# Patient Record
Sex: FEMALE | URBAN - METROPOLITAN AREA
[De-identification: names, ages, dates, MRNs, and addresses within clinical notes are randomized per-mention and may not be internally consistent; named-entity substitution may affect disease eponyms.]

---

## 2019-05-30 ENCOUNTER — OFFICE VISIT (OUTPATIENT)
Dept: URBAN - METROPOLITAN AREA CLINIC 81 | Facility: CLINIC | Age: 74
End: 2019-05-30
Payer: MEDICARE

## 2019-05-30 DIAGNOSIS — H25.13 AGE-RELATED NUCLEAR CATARACT, BILATERAL: ICD-10-CM

## 2019-05-30 DIAGNOSIS — H52.4 PRESBYOPIA: Primary | ICD-10-CM

## 2019-05-30 PROCEDURE — 92004 COMPRE OPH EXAM NEW PT 1/>: CPT | Performed by: OPTOMETRIST

## 2019-05-30 ASSESSMENT — VISUAL ACUITY
OD: 20/20
OS: 20/20

## 2019-05-30 ASSESSMENT — KERATOMETRY
OD: 45.00
OS: 45.25

## 2019-05-30 ASSESSMENT — INTRAOCULAR PRESSURE
OD: 18
OS: 16

## 2019-05-30 NOTE — IMPRESSION/PLAN
Impression: Age-related nuclear cataract, bilateral: H25.13. Pt not interested in cataract surgery at this time. Plan: Pt understands that condition can influence BVA/Rx. Recommend UV protection, will continue to observe/monitor.

## 2021-10-14 ENCOUNTER — OFFICE VISIT (OUTPATIENT)
Dept: URBAN - METROPOLITAN AREA CLINIC 81 | Facility: CLINIC | Age: 76
End: 2021-10-14
Payer: MEDICARE

## 2021-10-14 PROCEDURE — 92014 COMPRE OPH EXAM EST PT 1/>: CPT | Performed by: OPTOMETRIST

## 2021-10-14 ASSESSMENT — KERATOMETRY
OS: 44.88
OD: 45.00

## 2021-10-14 ASSESSMENT — VISUAL ACUITY
OD: 20/30
OS: 20/25

## 2021-10-14 NOTE — IMPRESSION/PLAN
Impression: Presbyopia: H52.4. Plan: Dispensed Rx for glasses to patient and instructed on normal adaptation period. Appreciates improvement over current habitual Rx.

## 2022-10-04 ENCOUNTER — OFFICE VISIT (OUTPATIENT)
Dept: URBAN - METROPOLITAN AREA CLINIC 81 | Facility: CLINIC | Age: 77
End: 2022-10-04
Payer: MEDICARE

## 2022-10-04 DIAGNOSIS — H52.4 PRESBYOPIA: ICD-10-CM

## 2022-10-04 DIAGNOSIS — H25.13 AGE-RELATED NUCLEAR CATARACT, BILATERAL: Primary | ICD-10-CM

## 2022-10-04 DIAGNOSIS — H04.123 DRY EYE SYNDROME OF BILATERAL LACRIMAL GLANDS: ICD-10-CM

## 2022-10-04 DIAGNOSIS — H01.8 OTHER SPECIFIED INFLAMMATIONS OF EYELID: ICD-10-CM

## 2022-10-04 PROCEDURE — 99214 OFFICE O/P EST MOD 30 MIN: CPT | Performed by: OPTOMETRIST

## 2022-10-04 RX ORDER — BACITRACIN ZINC AND POLYMYXIN B SULFATE 500; 10000 [USP'U]/G; [USP'U]/G
OINTMENT OPHTHALMIC
Qty: 3.5 | Refills: 3 | Status: ACTIVE
Start: 2022-10-04

## 2022-10-04 ASSESSMENT — INTRAOCULAR PRESSURE
OD: 20
OS: 18

## 2022-10-04 ASSESSMENT — VISUAL ACUITY
OD: 20/25
OS: 20/25

## 2022-10-04 ASSESSMENT — KERATOMETRY
OS: 45.63
OD: 44.25

## 2022-10-04 NOTE — IMPRESSION/PLAN
Impression: Age-related nuclear cataract, bilateral: H25.13. Plan: Cataracts account for the patient's complaints. Discussed all risks, benefits, procedures and recovery of cataracts surgery. Patient understands that changing the glasses prescription will not significantly improve vision. Patient elects to wait to proceed with cataract surgery at this time.

## 2022-10-04 NOTE — IMPRESSION/PLAN
Impression: Other specified inflammations of eyelid: H01.8. Bilateral. Plan: Blepharitis accounts for the patient's symptoms. Lid scrubs with diluted Deloise Regulus and Justin tear free baby shampoo as directed and Bacitracin/poly B ointment qhs OU and monitor. Patient unsure of pharmacy, advised to call once decides on pharmacy and we will send Bacitracin/Poly B.